# Patient Record
Sex: FEMALE | Race: BLACK OR AFRICAN AMERICAN | NOT HISPANIC OR LATINO | Employment: FULL TIME | URBAN - METROPOLITAN AREA
[De-identification: names, ages, dates, MRNs, and addresses within clinical notes are randomized per-mention and may not be internally consistent; named-entity substitution may affect disease eponyms.]

---

## 2018-07-26 PROBLEM — F41.1 GENERALIZED ANXIETY DISORDER: Status: ACTIVE | Noted: 2018-07-26

## 2018-07-26 PROBLEM — R60.0 LEG EDEMA, RIGHT: Status: ACTIVE | Noted: 2018-07-26

## 2018-08-16 PROBLEM — R31.29 MICROSCOPIC HEMATURIA: Status: ACTIVE | Noted: 2018-08-16

## 2019-03-16 ENCOUNTER — HOSPITAL ENCOUNTER (EMERGENCY)
Facility: HOSPITAL | Age: 24
Discharge: HOME | End: 2019-03-17
Attending: FAMILY MEDICINE
Payer: MEDICAID

## 2019-03-16 DIAGNOSIS — M79.604 CHRONIC PAIN OF RIGHT LOWER EXTREMITY: Primary | ICD-10-CM

## 2019-03-16 DIAGNOSIS — R52 PAIN: ICD-10-CM

## 2019-03-16 DIAGNOSIS — G89.29 CHRONIC PAIN OF RIGHT LOWER EXTREMITY: Primary | ICD-10-CM

## 2019-03-16 PROCEDURE — 63600175 PHARM REV CODE 636 W HCPCS: Performed by: FAMILY MEDICINE

## 2019-03-16 PROCEDURE — 99284 EMERGENCY DEPT VISIT MOD MDM: CPT | Mod: 25

## 2019-03-16 PROCEDURE — 96372 THER/PROPH/DIAG INJ SC/IM: CPT

## 2019-03-16 RX ORDER — KETOROLAC TROMETHAMINE 30 MG/ML
30 INJECTION, SOLUTION INTRAMUSCULAR; INTRAVENOUS
Status: COMPLETED | OUTPATIENT
Start: 2019-03-16 | End: 2019-03-16

## 2019-03-16 RX ORDER — MELOXICAM 7.5 MG/1
7.5 TABLET ORAL DAILY
Qty: 10 TABLET | Refills: 0 | Status: SHIPPED | OUTPATIENT
Start: 2019-03-16 | End: 2019-03-16 | Stop reason: ALTCHOICE

## 2019-03-16 RX ORDER — NAPROXEN 500 MG/1
500 TABLET ORAL 2 TIMES DAILY WITH MEALS
Qty: 20 TABLET | Refills: 0 | Status: SHIPPED | OUTPATIENT
Start: 2019-03-16 | End: 2019-03-26

## 2019-03-16 RX ADMIN — KETOROLAC TROMETHAMINE 30 MG: 30 INJECTION, SOLUTION INTRAMUSCULAR; INTRAVENOUS at 11:03

## 2019-03-17 VITALS
OXYGEN SATURATION: 98 % | DIASTOLIC BLOOD PRESSURE: 78 MMHG | BODY MASS INDEX: 33.14 KG/M2 | SYSTOLIC BLOOD PRESSURE: 133 MMHG | HEIGHT: 58 IN | HEART RATE: 87 BPM | TEMPERATURE: 98 F | RESPIRATION RATE: 20 BRPM | WEIGHT: 157.88 LBS

## 2019-03-17 NOTE — ED PROVIDER NOTES
"SCRIBE #1 NOTE: I, Sophie Cheney, am scribing for, and in the presence of, Laisha Casey MD. I have scribed the entire note.       History     Chief Complaint   Patient presents with    Leg Pain     RLE pain since      Review of patient's allergies indicates:  No Known Allergies      History of Present Illness     HPI    3/16/2019, 9:27 PM  History obtained from the patient      History of Present Illness: Aries Weber is a 23 y.o. female patient with a PMHx of spina bifida occulta who presents to the Emergency Department for evaluation of worsening bilateral lower leg pain which onset gradually over the past 5 years. Symptoms are intermittent and moderate in severity. No mitigating or exacerbating factors reported. Pt reports that she had a negative DVT US of her legs in . However, the US showed superficial varicose veins. Pain is described as a "deep pain".  Patient denies any pain shooting down her leg, CP, SOB, cough, back pain, recent travel, fever, chills, calf pain, n/v, recent injury, and all other sxs at this time. Prior Tx includes Naproxen, but pt denies any symptomatic relief. No further complaints or concerns at this time.         Arrival mode: Personal vehicle      PCP: Rhiannon Vincent MD        Past Medical History:  Past Medical History:   Diagnosis Date    Routine general medical examination at a health care facility 2015    Spina bifida occulta     Vitamin D deficiency        Past Surgical History:  Past Surgical History:   Procedure Laterality Date     SECTION      x2         Family History:  Family History   Problem Relation Age of Onset    Hypertension Mother     Cancer Father     Hypertension Father     Diabetes Neg Hx     Heart disease Neg Hx        Social History:  Social History     Tobacco Use    Smoking status: Never Smoker    Smokeless tobacco: Never Used   Substance and Sexual Activity    Alcohol use: No    Drug use: No    Sexual " activity: Yes        Review of Systems     Review of Systems   Constitutional: Negative for chills and fever.   HENT: Negative for sore throat.    Respiratory: Negative for cough and shortness of breath.    Cardiovascular: Negative for chest pain.   Gastrointestinal: Negative for nausea and vomiting.   Genitourinary: Negative for dysuria.   Musculoskeletal: Negative for back pain.        + BLE leg pain   Skin: Negative for rash.   Neurological: Negative for weakness.   Hematological: Does not bruise/bleed easily.   All other systems reviewed and are negative.       Physical Exam     Initial Vitals [03/16/19 2040]   BP Pulse Resp Temp SpO2   (!) 148/79 95 18 97.9 °F (36.6 °C) 97 %      MAP       --          Physical Exam  Nursing Notes and Vital Signs Reviewed.  Constitutional: Patient is in no apparent distress. Well-developed and well-nourished.  Head: Atraumatic. Normocephalic.  Eyes: PERRL. EOM intact. Conjunctivae are not pale. No scleral icterus.  ENT: Mucous membranes are moist. Oropharynx is clear and symmetric.    Neck: Supple. Full ROM. No lymphadenopathy.  Cardiovascular: Regular rate. Regular rhythm. No murmurs, rubs, or gallops. Distal pulses are 2+ and symmetric.  Pulmonary/Chest: No respiratory distress. Clear to auscultation bilaterally. No wheezing or rales.  Abdominal: Soft and non-distended.  There is no tenderness.  No rebound, guarding, or rigidity. Good bowel sounds.  Genitourinary: No CVA tenderness  Musculoskeletal: Moves all extremities. No obvious deformities. No edema. No calf tenderness. Negative Moose's sign.   Skin: Warm and dry.  Neurological:  Alert, awake, and appropriate.  Normal speech.  No acute focal neurological deficits are appreciated.  Psychiatric: Normal affect. Good eye contact. Appropriate in content.     ED Course   Procedures  ED Vital Signs:  Vitals:    03/16/19 2040 03/17/19 0013   BP: (!) 148/79 133/78   Pulse: 95 87   Resp: 18 20   Temp: 97.9 °F (36.6 °C) 98 °F (36.7  "°C)   TempSrc: Oral Oral   SpO2: 97% 98%   Weight: 71.6 kg (157 lb 13.6 oz)    Height: 4' 10" (1.473 m)          Imaging Results:  Imaging Results          US Lower Extremity Veins Right (Final result)  Result time 03/16/19 22:28:06    Final result by Chirag Owens III, MD (03/16/19 22:28:06)                 Impression:      Negative for rightlower extremity DVT.      Electronically signed by: Chirag Owens MD  Date:    03/16/2019  Time:    22:28             Narrative:    EXAMINATION:  US LOWER EXTREMITY VEINS RIGHT    CLINICAL HISTORY:  Pain, unspecified    FINDINGS:  Grayscale and color Doppler sonography was formed through the right lower extremity    The rightlower extremity veins are widely patent with normal augmentation, and compressibility and respiratory variability.                                          The Emergency Provider reviewed the vital signs and test results, which are outlined above.     ED Discussion     10:44 PM:  Discussed with pt all pertinent ED information and results. Discussed pt dx and plan of tx. Gave pt all f/u and return to the ED instructions. All questions and concerns were addressed at this time. Pt expresses understanding of information and instructions, and is comfortable with plan to discharge. Pt is stable for discharge.    Regarding CHRONIC PAIN, be advised that the emergency department is trained to assess and treat emergencies using professional resources and utilizing our best judgment when treating pain. Patient's should have only ONE provider and ONE pharmacy helping manage pain when dealing with controlled substances. The U.[place] Drug Enforcement Agency, [employer], and the [facility] discourages emergency medicine providers from:  prescribing pain medication to a patient that has already received pain medicine from another healthcare provider even if a patient alleges that prescriptions were stole or lost; prescribing pain " "medicines such as: OxyContin, MSContin, Fentanyl, Duragesic, Methadone, Opana ER, Exalgo, Subutex, Suboxone, or Methadone; and providing injections for "flare--ups" of chronic pain. Emergency medicine providers are encouraged and have the legal right to utilize the [place] Prescription Monitoring Program to track opioid pain medications and other controlled substance prescriptions and to notify law enforcement of any suspected abuse.    The treatment of chronic pain, as opposed to the treatment of acute pain, is beyond the scope of practice of emergency medicine and is considered a non-emergent, not life threatening, condition. As per [place] law (LAC 46:XLVII.4513.D.2.b), the practitioner will not prescribe controlled substances in connection with the treatment of chronic or intractable pain defined by LAC 46:XLV.0930-4569, as pain which persists beyond the usual course of a disease, beyond the expected time for healing from bodily trauma, or pain associated with a long-term incurable or intractable medical illness and/or disease. Furthermore, that pursuant to [place] §971, it is considered unlawful for an individual to knowingly or intentionally acquire or obtain possession of a controlled dangerous substance by misrepresentation, fraud, forgery, deception, or subterfuge; to obtain or attempt to obtain a prescription for a controlled substance and/or legend drug by fraud, theft, misrepresentation, deception or subterfuge; or obtain or seek to obtain any controlled dangerous substance or a prescription for a controlled dangerous substance from a healthcare practitioner, while being supplied with any controlled dangerous substance or a prescription for a controlled dangerous substance by another healthcare practitioner, without disclosing the fact of the existing prescription to the practitioner from whom the subsequent prescription for a controlled dangerous substance is sought.     Please contact your primary " "care provider or pain management specialist for any future concerns relating to your chronic pain.       Regarding CHRONIC PAIN, patient was instructed that the emergency department is trained to assess and treat emergencies using professional resources and utilize our best judgment when treating pain. Patient was advised that patient's should have only ONE provider and ONE pharmacy helping manage pain dealing with controlled substances. Patient was instructed that it is not recommended by the Drug Enforcement Agency, [employer], and the [facility] to prescribe pain medication to a patient that has already received pain medicine from another health care provider even if a patient alleges that prescriptions were stole or lost; prescribe pain medicines such as: OxyContin, MSContin, Fentanyl (Duragesic), Methadone, Opana ER, Exalgo, Subutex, Suboxone, or Methadone; provide injections for "flare--ups" of chronic pain; and have the right to utilize the [place] Prescription Monitoring Program to track opioid pain medications and other controlled substance prescriptions.   I discussed with patient and/or family/caretaker that evaluation in the ED does not suggest any emergent or life threatening medical conditions requiring immediate intervention beyond what was provided in the ED, and I believe patient is safe for discharge.  Regardless, an unremarkable evaluation in the ED does not preclude the development or presence of a serious of life threatening condition. As such, patient was instructed to return immediately for any worsening or change in current symptoms.      ED Medication(s):  Medications   ketorolac injection 30 mg (30 mg Intramuscular Given 3/16/19 2346)       Discharge Medication List as of 3/17/2019 12:09 AM      START taking these medications    Details   naproxen (NAPROSYN) 500 MG tablet Take 1 tablet (500 mg total) by mouth 2 (two) times daily with meals. for 10 " days, Starting Sat 3/16/2019, Until Tue 3/26/2019, Print             Follow-up Information     Schedule an appointment as soon as possible for a visit  with Rhiannon Vincent MD.    Specialty:  Internal Medicine  Why:  If symptoms worsen, As needed  Contact information:  [address] [facility] [id number] [phone]                         Medical Decision Making:   Clinical Tests:   Radiological Study: Ordered and Reviewed             Scribe Attestation:   Scribe #1: I performed the above scribed service and the documentation accurately describes the services I performed. I attest to the accuracy of the note.     Attending:   Physician Attestation Statement for Scribe #1: I, Laisha Casey MD, personally performed the services described in this documentation, as scribed by Sophie Cheney, in my presence, and it is both accurate and complete.           Clinical Impression       ICD-10-CM ICD-9-CM   1. Chronic pain of right lower extremity M79.604 729.5    G89.29 338.29   2. Pain R52 780.96       Disposition:   Disposition: Discharged  Condition: Stable         Laisha Casey MD  03/18/19 3190

## 2021-10-08 PROBLEM — N89.8 VAGINAL DISCHARGE: Status: ACTIVE | Noted: 2021-10-08

## 2022-08-15 ENCOUNTER — TELEPHONE (OUTPATIENT)
Dept: OBSTETRICS AND GYNECOLOGY | Facility: CLINIC | Age: 27
End: 2022-08-15
Payer: MEDICAID

## 2022-08-15 NOTE — TELEPHONE ENCOUNTER
----- Message from Kleber Pickett sent at 8/15/2022 11:39 AM CDT -----  Contact: [phone]  Patient would like to consult with a nurse in regards to her appt she had on today 8/15 at 11:30am. She stated she would like to know if she can still be seen today. Please reach out to her at [phone]. Thanks r/s

## 2022-09-07 ENCOUNTER — TELEPHONE (OUTPATIENT)
Dept: OBSTETRICS AND GYNECOLOGY | Facility: CLINIC | Age: 27
End: 2022-09-07
Payer: MEDICAID

## 2022-09-07 NOTE — TELEPHONE ENCOUNTER
----- Message from Stephan Ni sent at 9/7/2022  8:45 AM CDT -----  Contact: Aries Ornelas is requesting a sooner appt for this Thursday or Friday at any available time. Please give her a call back at [phone].       Thanks   JW

## 2022-10-04 ENCOUNTER — TELEPHONE (OUTPATIENT)
Dept: OBSTETRICS AND GYNECOLOGY | Facility: CLINIC | Age: 27
End: 2022-10-04
Payer: MEDICAID

## 2022-10-04 NOTE — TELEPHONE ENCOUNTER
----- Message from Yogesh Morris sent at 10/4/2022 11:50 AM CDT -----  Contact: Pt  Pt is calling rg wanting to come in to be seen today / any location / LMP 09/14 and is stating she needs to be seen prior to 8-10 weeks and can be reached at [phone]//agustin/thuy

## 2022-10-04 NOTE — TELEPHONE ENCOUNTER
----- Message from Marta Gaviria sent at 10/4/2022  8:38 AM CDT -----  Contact: PT  .Type:  Same Day Appointment Request    Caller is requesting a same day appointment.  Caller declined first available appointment listed below.    Name of Caller: Aries  When is the first available appointment? 10/11/2022   Symptoms:pain on side    Best Call Back Number:  [phone] (home)      Additional Information:  high risk-

## 2023-02-11 ENCOUNTER — HOSPITAL ENCOUNTER (EMERGENCY)
Facility: HOSPITAL | Age: 28
Discharge: HOME | End: 2023-02-12
Attending: EMERGENCY MEDICINE
Payer: MEDICAID

## 2023-02-11 DIAGNOSIS — N83.209 RUPTURED OVARIAN CYST: ICD-10-CM

## 2023-02-11 DIAGNOSIS — N83.209 CYST OF OVARY, UNSPECIFIED LATERALITY: ICD-10-CM

## 2023-02-11 DIAGNOSIS — R10.9 LEFT FLANK PAIN: ICD-10-CM

## 2023-02-11 DIAGNOSIS — D21.9 FIBROIDS: Primary | ICD-10-CM

## 2023-02-11 PROCEDURE — 99285 EMERGENCY DEPT VISIT HI MDM: CPT | Mod: 25

## 2023-02-11 PROCEDURE — 81000 URINALYSIS NONAUTO W/SCOPE: CPT | Performed by: EMERGENCY MEDICINE

## 2023-02-11 PROCEDURE — 81025 URINE PREGNANCY TEST: CPT | Performed by: EMERGENCY MEDICINE

## 2023-02-12 VITALS
TEMPERATURE: 99 F | WEIGHT: 170.63 LBS | DIASTOLIC BLOOD PRESSURE: 62 MMHG | RESPIRATION RATE: 18 BRPM | OXYGEN SATURATION: 99 % | BODY MASS INDEX: 34.4 KG/M2 | HEIGHT: 59 IN | SYSTOLIC BLOOD PRESSURE: 124 MMHG | HEART RATE: 89 BPM

## 2023-02-12 PROBLEM — N83.209 RUPTURED OVARIAN CYST: Status: ACTIVE | Noted: 2023-02-12

## 2023-02-12 PROBLEM — R10.9 LEFT FLANK PAIN: Status: ACTIVE | Noted: 2023-02-12

## 2023-02-12 PROBLEM — D21.9 FIBROIDS: Status: ACTIVE | Noted: 2023-02-12

## 2023-02-12 LAB
ALBUMIN SERPL BCP-MCNC: 3.7 G/DL (ref 3.5–5.2)
ALP SERPL-CCNC: 52 U/L (ref 55–135)
ALT SERPL W/O P-5'-P-CCNC: 12 U/L (ref 10–44)
ANION GAP SERPL CALC-SCNC: 12 MMOL/L (ref 8–16)
AST SERPL-CCNC: 16 U/L (ref 10–40)
B-HCG UR QL: NEGATIVE
BACTERIA #/AREA URNS HPF: NORMAL /HPF
BASOPHILS # BLD AUTO: 0.07 K/UL (ref 0–0.2)
BASOPHILS NFR BLD: 0.7 % (ref 0–1.9)
BILIRUB SERPL-MCNC: 0.3 MG/DL (ref 0.1–1)
BILIRUB UR QL STRIP: NEGATIVE
BUN SERPL-MCNC: 10 MG/DL (ref 6–20)
CALCIUM SERPL-MCNC: 8.9 MG/DL (ref 8.7–10.5)
CHLORIDE SERPL-SCNC: 109 MMOL/L (ref 95–110)
CLARITY UR: CLEAR
CO2 SERPL-SCNC: 20 MMOL/L (ref 23–29)
COLOR UR: YELLOW
CREAT SERPL-MCNC: 0.8 MG/DL (ref 0.5–1.4)
DIFFERENTIAL METHOD: ABNORMAL
EOSINOPHIL # BLD AUTO: 0.1 K/UL (ref 0–0.5)
EOSINOPHIL NFR BLD: 1 % (ref 0–8)
ERYTHROCYTE [DISTWIDTH] IN BLOOD BY AUTOMATED COUNT: 20.1 % (ref 11.5–14.5)
EST. GFR  (NO RACE VARIABLE): >60 ML/MIN/1.73 M^2
GLUCOSE SERPL-MCNC: 77 MG/DL (ref 70–110)
GLUCOSE UR QL STRIP: NEGATIVE
HCT VFR BLD AUTO: 32.5 % (ref 37–48.5)
HGB BLD-MCNC: 9.8 G/DL (ref 12–16)
HGB UR QL STRIP: NEGATIVE
HYALINE CASTS #/AREA URNS LPF: 1 /LPF
IMM GRANULOCYTES # BLD AUTO: 0.04 K/UL (ref 0–0.04)
IMM GRANULOCYTES NFR BLD AUTO: 0.4 % (ref 0–0.5)
KETONES UR QL STRIP: NEGATIVE
LEUKOCYTE ESTERASE UR QL STRIP: NEGATIVE
LIPASE SERPL-CCNC: 23 U/L (ref 4–60)
LYMPHOCYTES # BLD AUTO: 2.7 K/UL (ref 1–4.8)
LYMPHOCYTES NFR BLD: 26.1 % (ref 18–48)
MCH RBC QN AUTO: 21.4 PG (ref 27–31)
MCHC RBC AUTO-ENTMCNC: 30.2 G/DL (ref 32–36)
MCV RBC AUTO: 71 FL (ref 82–98)
MICROSCOPIC COMMENT: NORMAL
MONOCYTES # BLD AUTO: 0.9 K/UL (ref 0.3–1)
MONOCYTES NFR BLD: 8.4 % (ref 4–15)
NEUTROPHILS # BLD AUTO: 6.5 K/UL (ref 1.8–7.7)
NEUTROPHILS NFR BLD: 63.4 % (ref 38–73)
NITRITE UR QL STRIP: NEGATIVE
NRBC BLD-RTO: 0 /100 WBC
PH UR STRIP: 7 [PH] (ref 5–8)
PLATELET # BLD AUTO: 283 K/UL (ref 150–450)
PMV BLD AUTO: 10.7 FL (ref 9.2–12.9)
POTASSIUM SERPL-SCNC: 3.8 MMOL/L (ref 3.5–5.1)
PROT SERPL-MCNC: 7.4 G/DL (ref 6–8.4)
PROT UR QL STRIP: ABNORMAL
RBC # BLD AUTO: 4.59 M/UL (ref 4–5.4)
RBC #/AREA URNS HPF: 0 /HPF (ref 0–4)
SODIUM SERPL-SCNC: 141 MMOL/L (ref 136–145)
SP GR UR STRIP: >1.03 (ref 1–1.03)
SQUAMOUS #/AREA URNS HPF: 2 /HPF
URN SPEC COLLECT METH UR: ABNORMAL
UROBILINOGEN UR STRIP-ACNC: ABNORMAL EU/DL
WBC # BLD AUTO: 10.2 K/UL (ref 3.9–12.7)
WBC #/AREA URNS HPF: 0 /HPF (ref 0–5)

## 2023-02-12 PROCEDURE — 25000003 PHARM REV CODE 250: Performed by: EMERGENCY MEDICINE

## 2023-02-12 PROCEDURE — 80053 COMPREHEN METABOLIC PANEL: CPT | Performed by: EMERGENCY MEDICINE

## 2023-02-12 PROCEDURE — 85025 COMPLETE CBC W/AUTO DIFF WBC: CPT | Performed by: EMERGENCY MEDICINE

## 2023-02-12 PROCEDURE — 83690 ASSAY OF LIPASE: CPT | Performed by: EMERGENCY MEDICINE

## 2023-02-12 PROCEDURE — 96374 THER/PROPH/DIAG INJ IV PUSH: CPT

## 2023-02-12 PROCEDURE — 63600175 PHARM REV CODE 636 W HCPCS: Performed by: EMERGENCY MEDICINE

## 2023-02-12 RX ORDER — ONDANSETRON 4 MG/1
4 TABLET, ORALLY DISINTEGRATING ORAL EVERY 6 HOURS PRN
Qty: 30 TABLET | Refills: 0 | Status: SHIPPED | OUTPATIENT
Start: 2023-02-12

## 2023-02-12 RX ORDER — KETOROLAC TROMETHAMINE 30 MG/ML
30 INJECTION, SOLUTION INTRAMUSCULAR; INTRAVENOUS
Status: COMPLETED | OUTPATIENT
Start: 2023-02-12 | End: 2023-02-12

## 2023-02-12 RX ORDER — NAPROXEN 500 MG/1
500 TABLET ORAL 2 TIMES DAILY WITH MEALS
Qty: 20 TABLET | Refills: 0 | Status: SHIPPED | OUTPATIENT
Start: 2023-02-12

## 2023-02-12 RX ADMIN — SODIUM CHLORIDE 1000 ML: 9 INJECTION, SOLUTION INTRAVENOUS at 12:02

## 2023-02-12 RX ADMIN — KETOROLAC TROMETHAMINE 30 MG: 30 INJECTION, SOLUTION INTRAMUSCULAR; INTRAVENOUS at 01:02

## 2023-02-12 NOTE — ED PROVIDER NOTES
SCRIBE #1 NOTE: I, Niall Beltran, am scribing for, and in the presence of, Carlton Riley Jr., MD. I have scribed the entire note.       History     Chief Complaint   Patient presents with    Flank Pain     Pt reports L sided flank pain that radiates to lower abd that has been going on for months. Denies N/V/D, dysuria, hematuria. Reports yeast inf several weeks ago tx with Diflucan.     Review of patient's allergies indicates:  No Known Allergies      History of Present Illness     HPI    2023, 12:14 AM  History obtained from the patient      History of Present Illness: Aries Dee is a 27 y.o. female patient with a PMHx of fibroids who presents to the Emergency Department for evaluation of suprapubic abdominal pain which onset gradually 3 months ago. Pt states the pain became more persistent over the last month and radiates to her left flank. Symptoms are constant and moderate in severity. She notes constipation worsens her pain and had her last BM yesterday. She reports she takes iron supplements and has worsened fibroids pain during her menstrual cycles. She states her LMC was 20. She denies any BCP. Patient denies any vaginal bleeding, vaginal discharge, dysuria, CP, SOB, weakness, n/v/d, and all other sxs at this time. No further complaints or concerns at this time.       Arrival mode: Personal vehicle    PCP: Rhiannon Vincent MD        Past Medical History:  Past Medical History:   Diagnosis Date    ASCUS with positive high risk HPV cervical     Fibroids     IUD migration     embedded and removed    Routine general medical examination at a health care facility 2015    Spina bifida occulta     Vitamin D deficiency        Past Surgical History:  Past Surgical History:   Procedure Laterality Date     SECTION      x2         Family History:  Family History   Problem Relation Age of Onset    Hypertension Mother     Cancer Father     Hypertension Father     Breast cancer Maternal  Aunt     Diabetes Neg Hx     Heart disease Neg Hx        Social History:  Social History     Tobacco Use    Smoking status: Never    Smokeless tobacco: Never   Substance and Sexual Activity    Alcohol use: Yes     Comment: occ wine    Drug use: No    Sexual activity: Yes     Partners: Male     Birth control/protection: None        Review of Systems     Review of Systems   Constitutional:  Negative for fever.   HENT:  Negative for sore throat.    Respiratory:  Negative for shortness of breath.    Cardiovascular:  Negative for chest pain.   Gastrointestinal:  Positive for abdominal pain (suprapubic). Negative for diarrhea, nausea and vomiting.   Genitourinary:  Positive for flank pain (left). Negative for dysuria, vaginal bleeding and vaginal discharge.   Musculoskeletal:  Negative for back pain.   Skin:  Negative for rash.   Neurological:  Negative for weakness and headaches.   Hematological:  Does not bruise/bleed easily.   All other systems reviewed and are negative.     Physical Exam     Initial Vitals [02/11/23 2332]   BP Pulse Resp Temp SpO2   126/60 98 14 98.9 °F (37.2 °C) 100 %      MAP       --          Physical Exam   Nursing Notes and Vital Signs Reviewed.  Constitutional: Patient is in no acute distress. Well-developed and well-nourished.  Head: Atraumatic. Normocephalic.  Eyes: PERRL. EOM intact. Conjunctivae are not pale. No scleral icterus.  ENT: Mucous membranes are moist. Oropharynx is clear and symmetric.    Neck: Supple. Full ROM. No lymphadenopathy.  Cardiovascular: Regular rate. Regular rhythm. No murmurs, rubs, or gallops. Distal pulses are 2+ and symmetric.  Pulmonary/Chest: No respiratory distress. Clear to auscultation bilaterally. No wheezing or rales.  Abdominal: Soft and non-distended.  There is suprapubic tenderness.  No rebound, guarding, or rigidity. Good bowel sounds.  Genitourinary: Mild left CVA tenderness  Musculoskeletal: Moves all extremities. No obvious deformities. No edema. No  "calf tenderness.  Skin: Warm and dry.  Neurological:  Alert, awake, and appropriate.  Normal speech.  No acute focal neurological deficits are appreciated.  Psychiatric: Normal affect. Good eye contact. Appropriate in content.     ED Course   Procedures  ED Vital Signs:  Vitals:    02/11/23 2332 02/12/23 0524   BP: 126/60 124/62   Pulse: 98 89   Resp: 14 18   Temp: 98.9 °F (37.2 °C) 99 °F (37.2 °C)   TempSrc: Oral    SpO2: 100% 99%   Weight: 77.4 kg (170 lb 10.2 oz)    Height: 4' 11" (1.499 m)        Abnormal Lab Results:  Labs Reviewed   URINALYSIS, REFLEX TO URINE CULTURE - Abnormal; Notable for the following components:       Result Value    Specific Gravity, UA >1.030 (*)     Protein, UA 1+ (*)     Urobilinogen, UA 2.0-3.0 (*)     All other components within normal limits    Narrative:     Specimen Source->Urine   CBC W/ AUTO DIFFERENTIAL - Abnormal; Notable for the following components:    Hemoglobin 9.8 (*)     Hematocrit 32.5 (*)     MCV 71 (*)     MCH 21.4 (*)     MCHC 30.2 (*)     RDW 20.1 (*)     All other components within normal limits   COMPREHENSIVE METABOLIC PANEL - Abnormal; Notable for the following components:    CO2 20 (*)     Alkaline Phosphatase 52 (*)     All other components within normal limits   PREGNANCY TEST, URINE RAPID    Narrative:     For women of childbearing age w/o hysterectomy  Specimen Source->Urine   URINALYSIS MICROSCOPIC    Narrative:     Specimen Source->Urine   LIPASE        All Lab Results:  Results for orders placed or performed during the hospital encounter of 02/11/23   Urinalysis, Reflex to Urine Culture Urine, Clean Catch    Specimen: Urine   Result Value Ref Range    Specimen UA Urine, Clean Catch     Color, UA Yellow Yellow, Straw, Caitlin    Appearance, UA Clear Clear    pH, UA 7.0 5.0 - 8.0    Specific Gravity, UA >1.030 (A) 1.005 - 1.030    Protein, UA 1+ (A) Negative    Glucose, UA Negative Negative    Ketones, UA Negative Negative    Bilirubin (UA) Negative " Negative    Occult Blood UA Negative Negative    Nitrite, UA Negative Negative    Urobilinogen, UA 2.0-3.0 (A) <2.0 EU/dL    Leukocytes, UA Negative Negative   Pregnancy, urine rapid   Result Value Ref Range    Preg Test, Ur Negative    Urinalysis Microscopic   Result Value Ref Range    RBC, UA 0 0 - 4 /hpf    WBC, UA 0 0 - 5 /hpf    Bacteria Rare None-Occ /hpf    Squam Epithel, UA 2 /hpf    Hyaline Casts, UA 1 0-1/lpf /lpf    Microscopic Comment SEE COMMENT    CBC W/ AUTO DIFFERENTIAL   Result Value Ref Range    WBC 10.20 3.90 - 12.70 K/uL    RBC 4.59 4.00 - 5.40 M/uL    Hemoglobin 9.8 (L) 12.0 - 16.0 g/dL    Hematocrit 32.5 (L) 37.0 - 48.5 %    MCV 71 (L) 82 - 98 fL    MCH 21.4 (L) 27.0 - 31.0 pg    MCHC 30.2 (L) 32.0 - 36.0 g/dL    RDW 20.1 (H) 11.5 - 14.5 %    Platelets 283 150 - 450 K/uL    MPV 10.7 9.2 - 12.9 fL    Immature Granulocytes 0.4 0.0 - 0.5 %    Gran # (ANC) 6.5 1.8 - 7.7 K/uL    Immature Grans (Abs) 0.04 0.00 - 0.04 K/uL    Lymph # 2.7 1.0 - 4.8 K/uL    Mono # 0.9 0.3 - 1.0 K/uL    Eos # 0.1 0.0 - 0.5 K/uL    Baso # 0.07 0.00 - 0.20 K/uL    nRBC 0 0 /100 WBC    Gran % 63.4 38.0 - 73.0 %    Lymph % 26.1 18.0 - 48.0 %    Mono % 8.4 4.0 - 15.0 %    Eosinophil % 1.0 0.0 - 8.0 %    Basophil % 0.7 0.0 - 1.9 %    Differential Method Automated    Comp. Metabolic Panel   Result Value Ref Range    Sodium 141 136 - 145 mmol/L    Potassium 3.8 3.5 - 5.1 mmol/L    Chloride 109 95 - 110 mmol/L    CO2 20 (L) 23 - 29 mmol/L    Glucose 77 70 - 110 mg/dL    BUN 10 6 - 20 mg/dL    Creatinine 0.8 0.5 - 1.4 mg/dL    Calcium 8.9 8.7 - 10.5 mg/dL    Total Protein 7.4 6.0 - 8.4 g/dL    Albumin 3.7 3.5 - 5.2 g/dL    Total Bilirubin 0.3 0.1 - 1.0 mg/dL    Alkaline Phosphatase 52 (L) 55 - 135 U/L    AST 16 10 - 40 U/L    ALT 12 10 - 44 U/L    Anion Gap 12 8 - 16 mmol/L    eGFR >60 >60 mL/min/1.73 m^2   Lipase   Result Value Ref Range    Lipase 23 4 - 60 U/L         Imaging Results:  Imaging Results              CT Renal Stone  Study ABD Pelvis WO (Final result)  Result time 02/12/23 07:36:47      Final result by Edgar Fabian III, MD (02/12/23 07:36:47)                   Impression:      No acute abnormality identified in the abdomen or pelvis.    Note: All CT scans at this facility are performed  using dose modulation techniques as appropriate to performed exam including the following:  automated exposure control; adjustment of mA and/or kV according to the patients size (this includes techniques or standardized protocols for targeted exams where dose is matched to indication/reason for exam: i.e. extremities or head);  iterative reconstruction technique.      Electronically signed by: Edgar Fabian MD  Date:    02/12/2023  Time:    07:36               Narrative:    EXAMINATION:  CT RENAL STONE STUDY ABD PELVIS WO    CLINICAL HISTORY:  Flank pain, kidney stone suspected; Unspecified abdominal pain; left flank pain    TECHNIQUE:  Routine CT abdomen and pelvis performed without IV contrast.  Coronal and sagittal reformatted images obtained.    COMPARISON:  No prior abdominal CT available    FINDINGS:  Lung bases: No acute findings.    Bones: No acute osseous abnormality or suspicious bone lesions.    Kidneys and ureters: No evidence of urolithiasis, hydronephrosis or other acute findings.    Stomach and bowel: No acute findings.    Appendix: No evidence of appendicitis.    Liver: Unremarkable for noncontrast technique.    Gallbladder and bile ducts: Unremarkable.    Pancreas: Unremarkable for noncontrast technique.    Spleen: Unremarkable for noncontrast technique.    Adrenals: Unremarkable.    Bladder: Unremarkable.    Aorta: No aneurysm.    Reproductive organs: 2.8 cm right ovarian cyst/follicle.  Suspected small uterine fibroids.    Miscellaneous: No free intraperitoneal fluid, free air or abscess identified. No pathologic lymphadenopathy.                                     3:48 AM: Per Lucas Rondon MD from [facility], pt's CT  "abdomen and pelvis without IV Contrast results: 4 cm right adnexal cystic lesion, likely ovarian cyst. Suspect fibroid uterus.      The Emergency Provider reviewed the vital signs and test results, which are outlined above.     ED Discussion     12:21 AM: Pt denies any nausea medication at this time.    5:25 AM: Reassessed pt at this time. Discussed with pt all pertinent ED information and results. Discussed pt dx and plan of tx. Gave pt all f/u and return to the ED instructions. All questions and concerns were addressed at this time. Pt expresses understanding of information and instructions, and is comfortable with plan to discharge. Pt is stable for discharge.    I discussed with patient and/or family/caretaker that evaluation in the ED does not suggest any emergent or life threatening medical conditions requiring immediate intervention beyond what was provided in the ED, and I believe patient is safe for discharge.  Regardless, an unremarkable evaluation in the ED does not preclude the development or presence of a serious of life threatening condition. As such, patient was instructed to return immediately for any worsening or change in current symptoms.    Regarding UTERINE FIBROIDS discussed with pt:  What are fibroids? -- Fibroids are tough balls of muscle that form in the uterus. The uterus, also called the womb, is the part of the woman's body that holds a baby if she is pregnant.  People sometimes refer to fibroids as "tumors." But fibroids are not a form of cancer. They are simply abnormal growths in the muscle of the uterus.  What are the symptoms of fibroids? -- Fibroids often cause no symptoms at all. When they do cause symptoms, they can cause:  ?Heavy periods  ?Pain, pressure, or a feeling of "fullness" in the belly  ?The need to urinate often  ?Too few bowel movements (constipation)  ?Difficulty getting pregnant    How are fibroids treated? -- There are several treatment options. Each option has its " "own pros and cons. The right treatment for you will depend on:  ?The symptoms you have  ?Your age (because most fibroids shrink or stop causing symptoms after menopause)  ?Whether you are done having children  ?Whether your fibroids cause so much bleeding that you have a condition called anemia  ?The size, number, and location of your fibroids  ?How you feel about the risks and benefits of the different options   If you are thinking about treatment, ask your doctor or nurse which treatments might help you. Then ask what the risks and benefits of those options are. Ask, too, what happens if you do NOT have treatment. And be sure to mention whether or not you would like to have children.    Here are the options:  ?Medicines - The pills, patches, vaginal rings, injections, and implants used for birth control can all reduce how much you bleed during your period. A device known as the intrauterine device, or IUD, can also make your periods lighter. There are also other medicines that can reduce the amount a woman bleeds during her period. If bleeding is your main symptom, birth control methods or medicines might help you.  ?Surgery to remove the fibroids (doctors call this surgery "myomectomy") - During this operation, the doctor removes the fibroids but leaves the uterus in place. It is effective, but it is not always a permanent fix, because fibroids can come back. Myomectomy is often a good choice for women who might want (more) children.  ?Treatment to destroy the lining of the uterus (doctors call this procedure "endometrial ablation") - During this procedure, the doctor inserts a thin tube into the vagina, through the cervix and into the uterus. Then he or she uses tools inserted through that tube to destroy the lining of the uterus. This procedure reduces bleeding from heavy periods. But it is not an option for all women. It is also not appropriate for women who might want to get pregnant.  ?Treatment to cut off " "the blood supply to the fibroids (doctors call this procedure "uterine artery embolization" or "uterine fibroid embolization") - During this procedure, the doctor inserts a thin tube into an artery in the leg and threads it up to the uterus. Then he or she uses tiny plastic beads to block the artery that brings blood to the fibroid. After the procedure, the fibroid no longer gets blood, so it shrinks. This procedure is not appropriate for women who might want to get pregnant.  ?Surgery to remove the uterus (doctors call this surgery "hysterectomy") - This surgery gets rid of fibroids and the problems they cause forever. Women who have this surgery cannot have fibroids come back. But they can never bear children.  How do I choose which option is right for me? -- You work with your doctor to understand how the different treatment options would affect you. Then the two of you work together to choose the option that's right for you. For women who might still want to have children, medicines or myomectomy is often the best choice. Women who do not want to have (more) children can often choose from all the options. They need to consider how invasive each surgery is and whether they prefer surgery over taking medicines. One thing to consider is that fibroid-related symptoms often go away with menopause.       Medical Decision Making:   Clinical Tests:   Lab Tests: Ordered and Reviewed  Radiological Study: Ordered and Reviewed         ED Medication(s):  Medications   sodium chloride 0.9% bolus 1,000 mL 1,000 mL (1,000 mLs Intravenous New Bag 2/12/23 0042)   ketorolac injection 30 mg (30 mg Intravenous Given 2/12/23 0104)       Discharge Medication List as of 2/12/2023  5:13 AM        START taking these medications    Details   ondansetron (ZOFRAN-ODT) 4 MG TbDL Take 1 tablet (4 mg total) by mouth every 6 (six) hours as needed., Starting Sun 2/12/2023, Print              Follow-up Information       Rhiannon Vincent MD. " Schedule an appointment as soon as possible for a visit in 1 week.    Specialty: Internal Medicine  Contact information:  [address] [facility] [address] [phone]               [facility]. Schedule an appointment as soon as possible for a visit in 1 week.    Specialty: Obstetrics and Gynecology  Contact information:  [address] [facility] [address] [phone]  Additional information:  Please park on the [address] side and use the Clinic entrance. Check in on the 2nd floor, to the left.             [facility] - Emergency Dept..    Specialty: Emergency Medicine  Why: As needed, If symptoms worsen  Contact information:  [address] [facility] [address] [phone]                               Scribe Attestation:   Scribe #1: I performed the above scribed service and the documentation accurately describes the services I performed. I attest to the accuracy of the note.     Attending:   Physician Attestation Statement for Scribe #1: I, Carlton Riley Jr., MD, personally performed the services described in this documentation, as scribed by Niall Beltran, in my presence, and it is both accurate and complete.           Clinical Impression       ICD-10-CM ICD-9-CM   1. Fibroids  D21.9 215.9   2. Left flank pain  R10.9 789.09   3. Cyst of ovary, unspecified laterality  N83.209 620.2   4. Ruptured ovarian cyst  N83.209 620.2       Disposition:   Disposition: Discharged  Condition: Stable       Carlton Riley Jr., MD  02/14/23 0200

## 2023-02-12 NOTE — DISCHARGE INSTRUCTIONS
"Regarding UTERINE FIBROIDS discussed with pt:  What are fibroids? -- Fibroids are tough balls of muscle that form in the uterus. The uterus, also called the womb, is the part of the woman's body that holds a baby if she is pregnant.  People sometimes refer to fibroids as "tumors." But fibroids are not a form of cancer. They are simply abnormal growths in the muscle of the uterus.  What are the symptoms of fibroids? -- Fibroids often cause no symptoms at all. When they do cause symptoms, they can cause:  ?Heavy periods  ?Pain, pressure, or a feeling of "fullness" in the belly  ?The need to urinate often  ?Too few bowel movements (constipation)  ?Difficulty getting pregnant    How are fibroids treated? -- There are several treatment options. Each option has its own pros and cons. The right treatment for you will depend on:  ?The symptoms you have  ?Your age (because most fibroids shrink or stop causing symptoms after menopause)  ?Whether you are done having children  ?Whether your fibroids cause so much bleeding that you have a condition called anemia  ?The size, number, and location of your fibroids  ?How you feel about the risks and benefits of the different options   If you are thinking about treatment, ask your doctor or nurse which treatments might help you. Then ask what the risks and benefits of those options are. Ask, too, what happens if you do NOT have treatment. And be sure to mention whether or not you would like to have children.    Here are the options:  ?Medicines - The pills, patches, vaginal rings, injections, and implants used for birth control can all reduce how much you bleed during your period. A device known as the intrauterine device, or IUD, can also make your periods lighter. There are also other medicines that can reduce the amount a woman bleeds during her period. If bleeding is your main symptom, birth control methods or medicines might help you.  ?Surgery to remove the fibroids (doctors " "call this surgery "myomectomy") - During this operation, the doctor removes the fibroids but leaves the uterus in place. It is effective, but it is not always a permanent fix, because fibroids can come back. Myomectomy is often a good choice for women who might want (more) children.  ?Treatment to destroy the lining of the uterus (doctors call this procedure "endometrial ablation") - During this procedure, the doctor inserts a thin tube into the vagina, through the cervix and into the uterus. Then he or she uses tools inserted through that tube to destroy the lining of the uterus. This procedure reduces bleeding from heavy periods. But it is not an option for all women. It is also not appropriate for women who might want to get pregnant.  ?Treatment to cut off the blood supply to the fibroids (doctors call this procedure "uterine artery embolization" or "uterine fibroid embolization") - During this procedure, the doctor inserts a thin tube into an artery in the leg and threads it up to the uterus. Then he or she uses tiny plastic beads to block the artery that brings blood to the fibroid. After the procedure, the fibroid no longer gets blood, so it shrinks. This procedure is not appropriate for women who might want to get pregnant.  ?Surgery to remove the uterus (doctors call this surgery "hysterectomy") - This surgery gets rid of fibroids and the problems they cause forever. Women who have this surgery cannot have fibroids come back. But they can never bear children.  How do I choose which option is right for me? -- You work with your doctor to understand how the different treatment options would affect you. Then the two of you work together to choose the option that's right for you. For women who might still want to have children, medicines or myomectomy is often the best choice. Women who do not want to have (more) children can often choose from all the options. They need to consider how invasive each surgery is " and whether they prefer surgery over taking medicines. One thing to consider is that fibroid-related symptoms often go away with menopause.

## 2023-06-14 ENCOUNTER — TELEPHONE (OUTPATIENT)
Dept: OBSTETRICS AND GYNECOLOGY | Facility: CLINIC | Age: 28
End: 2023-06-14
Payer: MEDICAID

## 2023-06-14 NOTE — TELEPHONE ENCOUNTER
----- Message from Jose Carlos Tovar sent at 6/14/2023 11:36 AM CDT -----      Name of Who is Calling:PT          What is the request in detail:PT is requesting a call back to discuss being seen in your office for left breast pain and a possible Mammo screening. Please be Advised!          Can the clinic reply by [facility]:no          What Number to Call Back if not in [phone]

## 2023-06-14 NOTE — TELEPHONE ENCOUNTER
Called patient and advised no new Medicaid gyn appointments available and she should go to her own provider of record.  Patient verbalized understanding.

## 2023-06-27 ENCOUNTER — PATIENT MESSAGE (OUTPATIENT)
Dept: RESEARCH | Facility: HOSPITAL | Age: 28
End: 2023-06-27
Payer: MEDICAID

## 2023-07-05 ENCOUNTER — PATIENT MESSAGE (OUTPATIENT)
Dept: RESEARCH | Facility: HOSPITAL | Age: 28
End: 2023-07-05
Payer: MEDICAID

## 2023-07-19 ENCOUNTER — PATIENT MESSAGE (OUTPATIENT)
Dept: RESEARCH | Facility: HOSPITAL | Age: 28
End: 2023-07-19
Payer: MEDICAID